# Patient Record
Sex: MALE | Race: OTHER | NOT HISPANIC OR LATINO | URBAN - METROPOLITAN AREA
[De-identification: names, ages, dates, MRNs, and addresses within clinical notes are randomized per-mention and may not be internally consistent; named-entity substitution may affect disease eponyms.]

---

## 2025-05-07 ENCOUNTER — OUTPATIENT (OUTPATIENT)
Dept: OUTPATIENT SERVICES | Facility: HOSPITAL | Age: 65
LOS: 1 days | Discharge: HOME | End: 2025-05-07
Payer: MEDICARE

## 2025-05-07 VITALS
OXYGEN SATURATION: 97 % | TEMPERATURE: 98 F | HEIGHT: 69 IN | DIASTOLIC BLOOD PRESSURE: 82 MMHG | HEART RATE: 76 BPM | SYSTOLIC BLOOD PRESSURE: 132 MMHG | WEIGHT: 195.11 LBS | RESPIRATION RATE: 18 BRPM

## 2025-05-07 VITALS — SYSTOLIC BLOOD PRESSURE: 123 MMHG | RESPIRATION RATE: 19 BRPM | DIASTOLIC BLOOD PRESSURE: 76 MMHG | HEART RATE: 67 BPM

## 2025-05-07 DIAGNOSIS — Z98.890 OTHER SPECIFIED POSTPROCEDURAL STATES: Chronic | ICD-10-CM

## 2025-05-07 DIAGNOSIS — Z95.1 PRESENCE OF AORTOCORONARY BYPASS GRAFT: Chronic | ICD-10-CM

## 2025-05-07 DIAGNOSIS — H25.11 AGE-RELATED NUCLEAR CATARACT, RIGHT EYE: ICD-10-CM

## 2025-05-07 PROCEDURE — V2632: CPT

## 2025-05-07 NOTE — ASU PATIENT PROFILE, ADULT - FALL HARM RISK - HARM RISK INTERVENTIONS

## 2025-05-07 NOTE — PRE-ANESTHESIA EVALUATION ADULT - NSANTHDIETYNSD_GEN_ALL_CORE
A/O x1, with expressive aphasia. VSS.  Pt denies any c/o of pain. Up with 1 and walker to chair x3 today.  On 2L NC, LS coarse, exp wheeze, frequent cough but unable to obtain sputum sample.  Tele is SR.  Plan to continue with abx and steriods, cardiology signed off, TCU at discharge.    Yes

## 2025-05-09 DIAGNOSIS — H25.811 COMBINED FORMS OF AGE-RELATED CATARACT, RIGHT EYE: ICD-10-CM

## 2025-05-14 ENCOUNTER — OUTPATIENT (OUTPATIENT)
Dept: OUTPATIENT SERVICES | Facility: HOSPITAL | Age: 65
LOS: 1 days | Discharge: HOME | End: 2025-05-14
Payer: MEDICARE

## 2025-05-14 VITALS
HEIGHT: 69 IN | RESPIRATION RATE: 17 BRPM | HEART RATE: 78 BPM | TEMPERATURE: 98 F | OXYGEN SATURATION: 97 % | DIASTOLIC BLOOD PRESSURE: 78 MMHG | SYSTOLIC BLOOD PRESSURE: 137 MMHG | WEIGHT: 195.11 LBS

## 2025-05-14 VITALS — HEART RATE: 80 BPM | SYSTOLIC BLOOD PRESSURE: 134 MMHG | DIASTOLIC BLOOD PRESSURE: 77 MMHG | RESPIRATION RATE: 18 BRPM

## 2025-05-14 DIAGNOSIS — Z95.1 PRESENCE OF AORTOCORONARY BYPASS GRAFT: Chronic | ICD-10-CM

## 2025-05-14 DIAGNOSIS — H25.12 AGE-RELATED NUCLEAR CATARACT, LEFT EYE: ICD-10-CM

## 2025-05-14 DIAGNOSIS — Z98.49 CATARACT EXTRACTION STATUS, UNSPECIFIED EYE: Chronic | ICD-10-CM

## 2025-05-14 DIAGNOSIS — Z98.890 OTHER SPECIFIED POSTPROCEDURAL STATES: Chronic | ICD-10-CM

## 2025-05-14 PROCEDURE — V2632: CPT

## 2025-05-14 RX ORDER — AMLODIPINE BESYLATE 10 MG/1
1 TABLET ORAL
Refills: 0 | DISCHARGE

## 2025-05-14 NOTE — ASU PATIENT PROFILE, ADULT - NSICDXPASTSURGICALHX_GEN_ALL_CORE_FT
PAST SURGICAL HISTORY:  H/O bilateral inguinal hernia repair     History of cataract surgery     S/P AAA repair     S/P CABG x 4     S/P cataract extraction R IOL

## 2025-05-14 NOTE — ASU PATIENT PROFILE, ADULT - FALL HARM RISK - HARM RISK INTERVENTIONS

## 2025-05-16 DIAGNOSIS — F17.210 NICOTINE DEPENDENCE, CIGARETTES, UNCOMPLICATED: ICD-10-CM

## 2025-05-16 DIAGNOSIS — I10 ESSENTIAL (PRIMARY) HYPERTENSION: ICD-10-CM

## 2025-05-16 DIAGNOSIS — H25.812 COMBINED FORMS OF AGE-RELATED CATARACT, LEFT EYE: ICD-10-CM

## 2025-05-16 DIAGNOSIS — Z95.1 PRESENCE OF AORTOCORONARY BYPASS GRAFT: ICD-10-CM

## 2025-05-16 DIAGNOSIS — E78.00 PURE HYPERCHOLESTEROLEMIA, UNSPECIFIED: ICD-10-CM

## 2025-05-16 DIAGNOSIS — F10.11 ALCOHOL ABUSE, IN REMISSION: ICD-10-CM

## 2025-06-13 PROBLEM — I10 ESSENTIAL (PRIMARY) HYPERTENSION: Chronic | Status: ACTIVE | Noted: 2025-05-07

## 2025-06-13 PROBLEM — E78.5 HYPERLIPIDEMIA, UNSPECIFIED: Chronic | Status: ACTIVE | Noted: 2025-05-07

## 2025-06-16 ENCOUNTER — OUTPATIENT (OUTPATIENT)
Dept: OUTPATIENT SERVICES | Facility: HOSPITAL | Age: 65
LOS: 1 days | Discharge: HOME | End: 2025-06-16
Payer: MEDICARE

## 2025-06-16 ENCOUNTER — EMERGENCY (EMERGENCY)
Facility: HOSPITAL | Age: 65
LOS: 0 days | Discharge: HOME | End: 2025-06-16
Attending: EMERGENCY MEDICINE
Payer: MEDICARE

## 2025-06-16 ENCOUNTER — RESULT REVIEW (OUTPATIENT)
Age: 65
End: 2025-06-16

## 2025-06-16 VITALS
HEIGHT: 69 IN | HEART RATE: 72 BPM | OXYGEN SATURATION: 95 % | WEIGHT: 199.96 LBS | RESPIRATION RATE: 18 BRPM | DIASTOLIC BLOOD PRESSURE: 89 MMHG | TEMPERATURE: 99 F | SYSTOLIC BLOOD PRESSURE: 141 MMHG

## 2025-06-16 VITALS
HEIGHT: 69 IN | SYSTOLIC BLOOD PRESSURE: 143 MMHG | DIASTOLIC BLOOD PRESSURE: 73 MMHG | HEART RATE: 70 BPM | RESPIRATION RATE: 20 BRPM | TEMPERATURE: 98 F | OXYGEN SATURATION: 95 %

## 2025-06-16 VITALS
DIASTOLIC BLOOD PRESSURE: 109 MMHG | HEART RATE: 74 BPM | RESPIRATION RATE: 19 BRPM | TEMPERATURE: 99 F | OXYGEN SATURATION: 96 % | SYSTOLIC BLOOD PRESSURE: 177 MMHG

## 2025-06-16 DIAGNOSIS — I10 ESSENTIAL (PRIMARY) HYPERTENSION: ICD-10-CM

## 2025-06-16 DIAGNOSIS — M79.606 PAIN IN LEG, UNSPECIFIED: ICD-10-CM

## 2025-06-16 DIAGNOSIS — J44.9 CHRONIC OBSTRUCTIVE PULMONARY DISEASE, UNSPECIFIED: ICD-10-CM

## 2025-06-16 DIAGNOSIS — Z95.1 PRESENCE OF AORTOCORONARY BYPASS GRAFT: Chronic | ICD-10-CM

## 2025-06-16 DIAGNOSIS — R59.0 LOCALIZED ENLARGED LYMPH NODES: ICD-10-CM

## 2025-06-16 DIAGNOSIS — Z98.49 CATARACT EXTRACTION STATUS, UNSPECIFIED EYE: Chronic | ICD-10-CM

## 2025-06-16 DIAGNOSIS — I72.4 ANEURYSM OF ARTERY OF LOWER EXTREMITY: ICD-10-CM

## 2025-06-16 DIAGNOSIS — E78.5 HYPERLIPIDEMIA, UNSPECIFIED: ICD-10-CM

## 2025-06-16 DIAGNOSIS — R53.83 OTHER FATIGUE: ICD-10-CM

## 2025-06-16 DIAGNOSIS — I71.40 ABDOMINAL AORTIC ANEURYSM, WITHOUT RUPTURE, UNSPECIFIED: ICD-10-CM

## 2025-06-16 DIAGNOSIS — Z98.890 OTHER SPECIFIED POSTPROCEDURAL STATES: Chronic | ICD-10-CM

## 2025-06-16 DIAGNOSIS — F17.210 NICOTINE DEPENDENCE, CIGARETTES, UNCOMPLICATED: ICD-10-CM

## 2025-06-16 DIAGNOSIS — Z98.890 OTHER SPECIFIED POSTPROCEDURAL STATES: ICD-10-CM

## 2025-06-16 LAB
ALBUMIN SERPL ELPH-MCNC: 4.4 G/DL — SIGNIFICANT CHANGE UP (ref 3.5–5.2)
ALP SERPL-CCNC: 82 U/L — SIGNIFICANT CHANGE UP (ref 30–115)
ALT FLD-CCNC: 11 U/L — SIGNIFICANT CHANGE UP (ref 0–41)
ANION GAP SERPL CALC-SCNC: 12 MMOL/L — SIGNIFICANT CHANGE UP (ref 7–14)
ANION GAP SERPL CALC-SCNC: 8 MMOL/L — SIGNIFICANT CHANGE UP (ref 7–14)
APPEARANCE UR: CLEAR — SIGNIFICANT CHANGE UP
APTT BLD: 26.7 SEC — LOW (ref 27–39.2)
APTT BLD: 27.4 SEC — SIGNIFICANT CHANGE UP (ref 27–39.2)
AST SERPL-CCNC: 18 U/L — SIGNIFICANT CHANGE UP (ref 0–41)
BASOPHILS # BLD AUTO: 0.1 K/UL — SIGNIFICANT CHANGE UP (ref 0–0.2)
BASOPHILS NFR BLD AUTO: 1 % — SIGNIFICANT CHANGE UP (ref 0–1)
BILIRUB SERPL-MCNC: 0.2 MG/DL — SIGNIFICANT CHANGE UP (ref 0.2–1.2)
BILIRUB UR-MCNC: NEGATIVE — SIGNIFICANT CHANGE UP
BLD GP AB SCN SERPL QL: SIGNIFICANT CHANGE UP
BUN SERPL-MCNC: 13 MG/DL — SIGNIFICANT CHANGE UP (ref 10–20)
BUN SERPL-MCNC: 13 MG/DL — SIGNIFICANT CHANGE UP (ref 10–20)
CALCIUM SERPL-MCNC: 9.2 MG/DL — SIGNIFICANT CHANGE UP (ref 8.4–10.5)
CALCIUM SERPL-MCNC: 9.5 MG/DL — SIGNIFICANT CHANGE UP (ref 8.4–10.5)
CHLORIDE SERPL-SCNC: 106 MMOL/L — SIGNIFICANT CHANGE UP (ref 98–110)
CHLORIDE SERPL-SCNC: 106 MMOL/L — SIGNIFICANT CHANGE UP (ref 98–110)
CO2 SERPL-SCNC: 25 MMOL/L — SIGNIFICANT CHANGE UP (ref 17–32)
CO2 SERPL-SCNC: 25 MMOL/L — SIGNIFICANT CHANGE UP (ref 17–32)
COLOR SPEC: YELLOW — SIGNIFICANT CHANGE UP
CREAT SERPL-MCNC: 0.7 MG/DL — SIGNIFICANT CHANGE UP (ref 0.7–1.5)
CREAT SERPL-MCNC: 0.7 MG/DL — SIGNIFICANT CHANGE UP (ref 0.7–1.5)
DIFF PNL FLD: NEGATIVE — SIGNIFICANT CHANGE UP
EGFR: 104 ML/MIN/1.73M2 — SIGNIFICANT CHANGE UP
EOSINOPHIL # BLD AUTO: 0.11 K/UL — SIGNIFICANT CHANGE UP (ref 0–0.7)
EOSINOPHIL NFR BLD AUTO: 1.1 % — SIGNIFICANT CHANGE UP (ref 0–8)
GLUCOSE SERPL-MCNC: 107 MG/DL — HIGH (ref 70–99)
GLUCOSE SERPL-MCNC: 95 MG/DL — SIGNIFICANT CHANGE UP (ref 70–99)
GLUCOSE UR QL: NEGATIVE MG/DL — SIGNIFICANT CHANGE UP
HCT VFR BLD CALC: 37.7 % — LOW (ref 42–52)
HGB BLD-MCNC: 12.3 G/DL — LOW (ref 14–18)
IMM GRANULOCYTES NFR BLD AUTO: 0.4 % — HIGH (ref 0.1–0.3)
INR BLD: 0.89 RATIO — SIGNIFICANT CHANGE UP (ref 0.65–1.3)
INR BLD: 0.92 RATIO — SIGNIFICANT CHANGE UP (ref 0.65–1.3)
KETONES UR QL: NEGATIVE MG/DL — SIGNIFICANT CHANGE UP
LEUKOCYTE ESTERASE UR-ACNC: NEGATIVE — SIGNIFICANT CHANGE UP
LYMPHOCYTES # BLD AUTO: 2.74 K/UL — SIGNIFICANT CHANGE UP (ref 1.2–3.4)
LYMPHOCYTES # BLD AUTO: 27.5 % — SIGNIFICANT CHANGE UP (ref 20.5–51.1)
MCHC RBC-ENTMCNC: 28.2 PG — SIGNIFICANT CHANGE UP (ref 27–31)
MCHC RBC-ENTMCNC: 32.6 G/DL — SIGNIFICANT CHANGE UP (ref 32–37)
MCV RBC AUTO: 86.5 FL — SIGNIFICANT CHANGE UP (ref 80–94)
MONOCYTES # BLD AUTO: 0.65 K/UL — HIGH (ref 0.1–0.6)
MONOCYTES NFR BLD AUTO: 6.5 % — SIGNIFICANT CHANGE UP (ref 1.7–9.3)
NEUTROPHILS # BLD AUTO: 6.31 K/UL — SIGNIFICANT CHANGE UP (ref 1.4–6.5)
NEUTROPHILS NFR BLD AUTO: 63.5 % — SIGNIFICANT CHANGE UP (ref 42.2–75.2)
NITRITE UR-MCNC: NEGATIVE — SIGNIFICANT CHANGE UP
NRBC BLD AUTO-RTO: 0 /100 WBCS — SIGNIFICANT CHANGE UP (ref 0–0)
PH UR: 7 — SIGNIFICANT CHANGE UP (ref 5–8)
PLATELET # BLD AUTO: 383 K/UL — SIGNIFICANT CHANGE UP (ref 130–400)
PMV BLD: 9.6 FL — SIGNIFICANT CHANGE UP (ref 7.4–10.4)
POTASSIUM SERPL-MCNC: 4.4 MMOL/L — SIGNIFICANT CHANGE UP (ref 3.5–5)
POTASSIUM SERPL-MCNC: 4.9 MMOL/L — SIGNIFICANT CHANGE UP (ref 3.5–5)
POTASSIUM SERPL-SCNC: 4.4 MMOL/L — SIGNIFICANT CHANGE UP (ref 3.5–5)
POTASSIUM SERPL-SCNC: 4.9 MMOL/L — SIGNIFICANT CHANGE UP (ref 3.5–5)
PROT SERPL-MCNC: 7.9 G/DL — SIGNIFICANT CHANGE UP (ref 6–8)
PROT UR-MCNC: NEGATIVE MG/DL — SIGNIFICANT CHANGE UP
PROTHROM AB SERPL-ACNC: 10.5 SEC — SIGNIFICANT CHANGE UP (ref 9.95–12.87)
PROTHROM AB SERPL-ACNC: 10.8 SEC — SIGNIFICANT CHANGE UP (ref 9.95–12.87)
RBC # BLD: 4.36 M/UL — LOW (ref 4.7–6.1)
RBC # FLD: 17.5 % — HIGH (ref 11.5–14.5)
SODIUM SERPL-SCNC: 139 MMOL/L — SIGNIFICANT CHANGE UP (ref 135–146)
SODIUM SERPL-SCNC: 143 MMOL/L — SIGNIFICANT CHANGE UP (ref 135–146)
SP GR SPEC: >1.03 — HIGH (ref 1–1.03)
UROBILINOGEN FLD QL: 0.2 MG/DL — SIGNIFICANT CHANGE UP (ref 0.2–1)
WBC # BLD: 9.95 K/UL — SIGNIFICANT CHANGE UP (ref 4.8–10.8)
WBC # FLD AUTO: 9.95 K/UL — SIGNIFICANT CHANGE UP (ref 4.8–10.8)

## 2025-06-16 PROCEDURE — 85025 COMPLETE CBC W/AUTO DIFF WBC: CPT

## 2025-06-16 PROCEDURE — 93010 ELECTROCARDIOGRAM REPORT: CPT

## 2025-06-16 PROCEDURE — 86901 BLOOD TYPING SEROLOGIC RH(D): CPT

## 2025-06-16 PROCEDURE — 76882 US LMTD JT/FCL EVL NVASC XTR: CPT | Mod: LT

## 2025-06-16 PROCEDURE — 86900 BLOOD TYPING SEROLOGIC ABO: CPT

## 2025-06-16 PROCEDURE — 93005 ELECTROCARDIOGRAM TRACING: CPT

## 2025-06-16 PROCEDURE — 71045 X-RAY EXAM CHEST 1 VIEW: CPT

## 2025-06-16 PROCEDURE — 85730 THROMBOPLASTIN TIME PARTIAL: CPT

## 2025-06-16 PROCEDURE — 36415 COLL VENOUS BLD VENIPUNCTURE: CPT

## 2025-06-16 PROCEDURE — 36000 PLACE NEEDLE IN VEIN: CPT | Mod: XU

## 2025-06-16 PROCEDURE — 71045 X-RAY EXAM CHEST 1 VIEW: CPT | Mod: 26

## 2025-06-16 PROCEDURE — 76882 US LMTD JT/FCL EVL NVASC XTR: CPT | Mod: 26,LT

## 2025-06-16 PROCEDURE — 99285 EMERGENCY DEPT VISIT HI MDM: CPT | Mod: 25

## 2025-06-16 PROCEDURE — 99285 EMERGENCY DEPT VISIT HI MDM: CPT | Mod: FS

## 2025-06-16 PROCEDURE — 74174 CTA ABD&PLVS W/CONTRAST: CPT | Mod: 26

## 2025-06-16 PROCEDURE — 81003 URINALYSIS AUTO W/O SCOPE: CPT

## 2025-06-16 PROCEDURE — 85610 PROTHROMBIN TIME: CPT

## 2025-06-16 PROCEDURE — 86850 RBC ANTIBODY SCREEN: CPT

## 2025-06-16 PROCEDURE — 74174 CTA ABD&PLVS W/CONTRAST: CPT

## 2025-06-16 PROCEDURE — 80048 BASIC METABOLIC PNL TOTAL CA: CPT

## 2025-06-16 PROCEDURE — 80053 COMPREHEN METABOLIC PANEL: CPT

## 2025-06-16 RX ORDER — ASPIRIN 325 MG
1 TABLET ORAL
Refills: 0 | DISCHARGE

## 2025-06-16 RX ORDER — SODIUM CHLORIDE 9 G/1000ML
1000 INJECTION, SOLUTION INTRAVENOUS ONCE
Refills: 0 | Status: COMPLETED | OUTPATIENT
Start: 2025-06-16 | End: 2025-06-16

## 2025-06-16 RX ORDER — AMLODIPINE BESYLATE 10 MG/1
0 TABLET ORAL
Refills: 0 | DISCHARGE

## 2025-06-16 RX ORDER — AMLODIPINE BESYLATE 10 MG/1
1 TABLET ORAL
Refills: 0 | DISCHARGE

## 2025-06-16 RX ORDER — ACETAMINOPHEN 500 MG/5ML
650 LIQUID (ML) ORAL ONCE
Refills: 0 | Status: COMPLETED | OUTPATIENT
Start: 2025-06-16 | End: 2025-06-16

## 2025-06-16 RX ADMIN — SODIUM CHLORIDE 1000 MILLILITER(S): 9 INJECTION, SOLUTION INTRAVENOUS at 15:56

## 2025-06-16 RX ADMIN — Medication 650 MILLIGRAM(S): at 15:57

## 2025-06-16 NOTE — ED ADULT TRIAGE NOTE - CHIEF COMPLAINT QUOTE
Pt brought by rn from IR, pt was scheduled to have a L inguinal lymph node biopsy due to groin pain, was found to have a L groin pulsating mass, sent to ED

## 2025-06-16 NOTE — ED PROVIDER NOTE - OBJECTIVE STATEMENT
64-year-old male with a past medical history of hypertension and hld presents for left groin mass.  Patient states he recently had a PET scan due to the mass and when he went to follow-up with IR today he was referred to the ED because they believed it could be an aneurysm.  Patient states to have noticed the mass for "a while". pt denies any other symptoms including fevers, chill, headache, recent illness/travel, cough, abdominal pain, chest pain, or SOB.

## 2025-06-16 NOTE — ED PROVIDER NOTE - DIFFERENTIAL DIAGNOSIS
The differential diagnosis for patients clinical presentation includes but is not limited to:  malignancy  aneurysm  metabolic vs infectious etiology  fistula Differential Diagnosis

## 2025-06-16 NOTE — ED PROVIDER NOTE - PHYSICAL EXAMINATION
Gen: NAD, AOx3  Head: NCAT  HEENT: PERRL, oral mucosa moist, normal conjunctiva, oropharynx clear without exudate or erythema  Lung: CTAB, no respiratory distress, no wheezing, rales, rhonchi  CV: normal s1/s2, rrr, Normal perfusion, pulses 2+ throughout  Abd: soft, NTND, no CVA tenderness  Genitourinary: no pelvic tenderness, L inguinal pulsatile mass  MSK: No edema, no visible deformities, full range of motion in all 4 extremities  Neuro: CN II-XII grossly intact, No focal neurologic deficits  Skin: No rash   Psych: normal affect

## 2025-06-16 NOTE — ED PROVIDER NOTE - NSFOLLOWUPINSTRUCTIONS_ED_ALL_ED_FT
Please follow-up with Dr. Espinoza at your scheduled appointment on June 24 at 1:15 PM.  Pseudoaneurysms, often occurring after procedures like cardiac catheterization, require careful management. Small, asymptomatic pseudoaneurysms may resolve on their own with observation and blood pressure control. Larger or symptomatic pseudoaneurysms may need treatment, including ultrasound-guided compression, thrombin injection, or surgical repair. It's crucial to manage risk factors like high blood pressure, avoid nicotine and alcohol, and follow a healthy lifestyle.   Home Care Instructions:  Medication:  Take all medications as prescribed, including blood pressure and other medications. Contact your doctor if you experience side effects.   Activity:  Follow your doctor's recommendations for physical activity. Some limitations may be necessary to avoid rupture.   Blood Pressure Management:  Control high blood pressure through lifestyle changes and medications, as it can increase the risk of rupture.   Diet:  Follow a heart-healthy diet, including fruits, vegetables, whole grains, and low-fat dairy. Limit sodium, alcohol, and sweets.   Weight Management:  Maintain a healthy weight. If needed, work with your doctor on a weight loss plan.   Smoking and Alcohol:  Avoid smoking and excessive alcohol consumption. If you need help quitting, talk to your doctor, says [facility].   Follow-up:  Attend all scheduled follow-up appointments with your doctor, including ultrasound scans, to monitor the pseudoaneurysm's progress.   Recognize Symptoms:  Be aware of symptoms like increasing pain, swelling, or throbbing at the site. Seek immediate medical attention if you experience any of these, especially if you had a recent endovascular procedure.   Rupture:  A pseudoaneurysm rupture is a medical emergency. Call 911 if you experience symptoms like severe pain, rapid swelling, or signs of shock, according to the [facility].   Treatment Options:  Observation:  .  Small, asymptomatic pseudoaneurysms may be monitored with regular checkups and ultrasounds for spontaneous closure.   Ultrasound-Guided Compression:  .  For larger pseudoaneurysms, direct pressure on the neck of the pseudoaneurysm under ultrasound guidance can help promote clotting.   Thrombin Injection:  .  Ultrasound-guided injection of thrombin into the pseudoaneurysm can also promote clotting and closure.   Surgical Repair:  .  In some cases, surgical repair may be necessary, especially for large, symptomatic, or infected pseudoaneurysms, according to [facility].   Important Considerations:  Distal Pulses:  Always assess and document distal pulses before and after any intervention, recommends [employer].   Infected Pseudoaneurysms:  If infected, ensure appropriate blood tests (FBC, CRP, U&Es, clotting) and blood cultures are taken.   Complications:  Monitor for complications like embolization, rupture, bleeding, or thrombosis.   Interprofessional Collaboration:  Management of pseudoaneurysms often requires a team approach involving physicians, specialists, and nurses. Please follow-up with Dr. Espinoza at your scheduled appointment on June 24 at 1:15 PM.    Pseudoaneurysms, often occurring after procedures like cardiac catheterization, require careful management. Small, asymptomatic pseudoaneurysms may resolve on their own with observation and blood pressure control. Larger or symptomatic pseudoaneurysms may need treatment, including ultrasound-guided compression, thrombin injection, or surgical repair. It's crucial to manage risk factors like high blood pressure, avoid nicotine and alcohol, and follow a healthy lifestyle.   Home Care Instructions:  Medication:  Take all medications as prescribed, including blood pressure and other medications. Contact your doctor if you experience side effects.   Activity:  Follow your doctor's recommendations for physical activity. Some limitations may be necessary to avoid rupture.   Blood Pressure Management:  Control high blood pressure through lifestyle changes and medications, as it can increase the risk of rupture.   Diet:  Follow a heart-healthy diet, including fruits, vegetables, whole grains, and low-fat dairy. Limit sodium, alcohol, and sweets.   Weight Management:  Maintain a healthy weight. If needed, work with your doctor on a weight loss plan.   Smoking and Alcohol:  Avoid smoking and excessive alcohol consumption. If you need help quitting, talk to your doctor, says [facility].   Follow-up:  Attend all scheduled follow-up appointments with your doctor, including ultrasound scans, to monitor the pseudoaneurysm's progress.   Recognize Symptoms:  Be aware of symptoms like increasing pain, swelling, or throbbing at the site. Seek immediate medical attention if you experience any of these, especially if you had a recent endovascular procedure.   Rupture:  A pseudoaneurysm rupture is a medical emergency. Call 911 if you experience symptoms like severe pain, rapid swelling, or signs of shock, according to the [facility].   Treatment Options:  Observation:  .  Small, asymptomatic pseudoaneurysms may be monitored with regular checkups and ultrasounds for spontaneous closure.   Ultrasound-Guided Compression:  .  For larger pseudoaneurysms, direct pressure on the neck of the pseudoaneurysm under ultrasound guidance can help promote clotting.   Thrombin Injection:  .  Ultrasound-guided injection of thrombin into the pseudoaneurysm can also promote clotting and closure.   Surgical Repair:  .  In some cases, surgical repair may be necessary, especially for large, symptomatic, or infected pseudoaneurysms, according to [facility].   Important Considerations:  Distal Pulses:  Always assess and document distal pulses before and after any intervention, recommends [employer].   Infected Pseudoaneurysms:  If infected, ensure appropriate blood tests (FBC, CRP, U&Es, clotting) and blood cultures are taken.   Complications:  Monitor for complications like embolization, rupture, bleeding, or thrombosis.   Interprofessional Collaboration:  Management of pseudoaneurysms often requires a team approach involving physicians, specialists, and nurses.

## 2025-06-16 NOTE — ED PROVIDER NOTE - CLINICAL SUMMARY MEDICAL DECISION MAKING FREE TEXT BOX
64-year-old male with past medical history of hypertension, hyperlipidemia, COPD not on home O2, lymphadenopathy, pshx of inginal hernia repairs, came to the hospital today to be seen by IR for left groin inguinal lymph node biopsy, ultrasound was obtained and noted to have a pulsatile mass so sent to the ED for further evaluation.  Patient reports that his PMD had him get a PET scan on May 30 for this left inguinal mass and then was sent to follow-up with hematologist oncologist Dr. Young approximately 4 days ago who told patient she would need a biopsy so was sent in today.  Patient denies discomfort to the left inguinal mass, throbbing, constant, moderate intensity non-radiating, worse with palpation, no alleviating factors.  Patient denies night sweats, decreased appetite, weight loss.  denies fever, chills, n/v, cp, sob, pleuritic chest pain, palpitations, diaphoresis, cough, abd pain, diarrhea, constipation, melena/brbpr, urinary symptoms, back/ flank pain, penile pain/discharge, sick contacts, recent travel or rash.     on exam: non toxic pt sitting on stretcher in nad, no rash, mmm, regular rate, radial pulses 2/4 b/l, no jvd, ctabl w/ breath sounds present b/l, no wheezing or crackles, no accessory muscle use, no tachypnea, no stridor, bs present throughout all 4 quadrants, abd soft, nd, nt , no rebound tenderness or guarding, no cvat, (-) Rovsing (-) Obturator (-) Psaos. (-) Martinez's,  exam done with supervision with LEELEE Mcdonough as given permission by pt: circumcised male, no penile pain or discharge, no testicular pain/swelling/erythema, L inguinal pulsatile mass, good cremasteric reflex, FROM of ext, no edema, no calf pain/swelling/erythema, AAOx3. No focal deficits.    Plan: Labs, ivf, imaging, pain control, vascular surgery consult, reassess.   Labs and EKG were ordered and reviewed.  Imaging was ordered and reviewed by me.  Appropriate medications for patient's presenting complaints were ordered and effects were reassessed.  Patient's records (prior hospital, ED visit) were reviewed. Additional history obtained from Daughter. 64-year-old male with past medical history of hypertension, hyperlipidemia, COPD not on home O2, lymphadenopathy, pshx of inginal hernia repairs, came to the hospital today to be seen by IR for left groin inguinal lymph node biopsy, ultrasound was obtained and noted to have a pulsatile mass so sent to the ED for further evaluation.  Patient reports that his PMD had him get a PET scan on May 30 for this left inguinal mass and then was sent to follow-up with hematologist oncologist Dr. Young approximately 4 days ago who told patient she would need a biopsy so was sent in today.  Patient denies discomfort to the left inguinal mass, throbbing, constant, moderate intensity non-radiating, worse with palpation, no alleviating factors.  Patient denies night sweats, decreased appetite, weight loss.  denies fever, chills, n/v, cp, sob, pleuritic chest pain, palpitations, diaphoresis, cough, abd pain, diarrhea, constipation, melena/brbpr, urinary symptoms, back/ flank pain, penile pain/discharge, sick contacts, recent travel or rash.     on exam: non toxic pt sitting on stretcher in nad, no rash, mmm, regular rate, radial pulses 2/4 b/l, no jvd, ctabl w/ breath sounds present b/l, no wheezing or crackles, no accessory muscle use, no tachypnea, no stridor, bs present throughout all 4 quadrants, abd soft, nd, nt , no rebound tenderness or guarding, no cvat, (-) Rovsing (-) Obturator (-) Psaos. (-) Martinez's,  exam done with supervision with LEELEE Mcdonough as given permission by pt: circumcised male, no penile pain or discharge, no testicular pain/swelling/erythema, L inguinal pulsatile mass, good cremasteric reflex, FROM of ext, no edema, no calf pain/swelling/erythema, AAOx3. No focal deficits.    Plan: Labs, ivf, imaging, pain control, vascular surgery consult, reassess.   Labs and EKG were ordered and reviewed.  Imaging was ordered and reviewed by me.  Appropriate medications for patient's presenting complaints were ordered and effects were reassessed.  Patient's records (prior hospital, ED visit) were reviewed. Additional history obtained from Daughter. Escalation to admission/observation was considered. However patient feels much better and is comfortable with discharge.  Appropriate follow-up was arranged.  Supportive care and home care discussed in detail. Patient aware they may have to return for re-evaluation and possible admission if outpatient treatment fails. Strict return precautions discussed.

## 2025-06-16 NOTE — ED PROVIDER NOTE - PATIENT PORTAL LINK FT
You can access the FollowMyHealth Patient Portal offered by [facility] by registering at the following website: [email or web address]. By joining [employer]’s FollowMyHealth portal, you will also be able to view your health information using other applications (apps) compatible with our system.

## 2025-06-16 NOTE — H&P ADULT - NSHPPHYSICALEXAM_GEN_ALL_CORE
Gen: A&O NAD    HEENT: NCAT, EOMI, not icteric. External ears normal. No rhinorrhea. Moist mucous membranes.    Neck: Supple, full range of motion, no observable masses, No meningeal sign.    Lungs: No Respiratory distress.    CV: RRR, no edema.    Abdomen: Soft, nondistended, No rebound tenderness.    MSK: No joint swelling, no redness.    Skin: No rashes, petechiae, lesions. Normal color per patient.    Neuro: Normal Gait, Grossly intact.    Psych: Appropriate for situation.

## 2025-06-16 NOTE — CONSULT NOTE ADULT - SUBJECTIVE AND OBJECTIVE BOX
VASCULAR SURGERY CONSULT NOTE      HPI:      Vascular surgery got consulted for     On PE:       Pulses as following:      PAST MEDICAL & SURGICAL HISTORY:  HTN (hypertension)      HLD (hyperlipidemia)      S/P AAA repair      S/P CABG x 4      H/O bilateral inguinal hernia repair      History of cataract surgery      S/P cataract extraction  R IOL        No Known Allergies    Home Medications:  amLODIPine 10 mg oral tablet: 1 tab(s) orally (16 Jun 2025 09:43)  aspirin 81 mg oral tablet, chewable: 1 tab(s) chewed (16 Jun 2025 09:47)    No permtinent family history of PVD    REVIEW OF SYSTEMS:  GENERAL:                                         negative  SKIN:                                                 negative  OPTHALMOLOGIC:                          negative  ENMT:                                               negative  RESPIRATORY AND THORAX:        negative  CARDIOVASCULAR:                         negative  GASTROINTESTINAL:                       negative  NEPHROLOGY:                                  negative  MUSCULOSKELETAL:                       negative  NEUROLOGIC:                                   negative  PSYCHIATRIC:                                    negative  HEMATOLOGY/LYMPHATICS:         negative  ENDOCRINE:                                     negative  ALLERGIC/IMMUNOLOGIC:            negative    12 point ROS otherwise normal except as stated in HPI  FHx: none  SHX:  [ ]  smoking     [ ] alcohol use    PHYSICAL EXAM  Vital Signs Last 24 Hrs  T(C): 37.2 (16 Jun 2025 17:00), Max: 37.2 (16 Jun 2025 17:00)  T(F): 98.9 (16 Jun 2025 17:00), Max: 98.9 (16 Jun 2025 17:00)  HR: 74 (16 Jun 2025 17:00) (70 - 74)  BP: 177/109 (16 Jun 2025 17:00) (141/89 - 177/109)  BP(mean): 132 (16 Jun 2025 17:00) (132 - 132)  RR: 19 (16 Jun 2025 17:00) (18 - 20)  SpO2: 96% (16 Jun 2025 17:00) (95% - 96%)    Parameters below as of 16 Jun 2025 17:00  Patient On (Oxygen Delivery Method): room air        Appearance: Normal	  HEENT:   Normal oral mucosa, PERRL, EOMI	  Neck: Supple, - JVD; Carotid Bruit   Cardiovascular: Normal S1 S2, No JVD, No murmurs,   Respiratory: Lungs clear to auscultation, No Rales, Rhonchi, Wheezing	  Gastrointestinal:  Soft, Non-tender, positive BS	  Skin: No rashes, No ecchymoses, No cyanosis  Extremities: Normal range of motion, No clubbing, cyanosis or edema  Vascular: As below  Neurologic: Non-focal  Psychiatry: A & O x 3, Mood & affect appropriate      PULSES:  Femoral:  Popliteal:  Dorsal Pedal:  Posterior Tibial:  Capillary:    MEDICATIONS:   MEDICATIONS  (STANDING):    MEDICATIONS  (PRN):      LAB/STUDIES:                        12.3   9.95  )-----------( 383      ( 16 Jun 2025 14:19 )             37.7     06-16    143  |  106  |  13  ----------------------------<  95  4.4   |  25  |  0.7    Ca    9.5      16 Jun 2025 14:19    TPro  7.9  /  Alb  4.4  /  TBili  0.2  /  DBili  x   /  AST  18  /  ALT  11  /  AlkPhos  82  06-16    PT/INR - ( 16 Jun 2025 14:19 )   PT: 10.80 sec;   INR: 0.92 ratio         PTT - ( 16 Jun 2025 14:19 )  PTT:27.4 sec  LIVER FUNCTIONS - ( 16 Jun 2025 14:19 )  Alb: 4.4 g/dL / Pro: 7.9 g/dL / ALK PHOS: 82 U/L / ALT: 11 U/L / AST: 18 U/L / GGT: x             Urinalysis Basic - ( 16 Jun 2025 16:18 )    Color: Yellow / Appearance: Clear / SG: >1.030 / pH: x  Gluc: x / Ketone: x  / Bili: Negative / Urobili: 0.2 mg/dL   Blood: x / Protein: Negative mg/dL / Nitrite: Negative   Leuk Esterase: Negative / RBC: x / WBC x   Sq Epi: x / Non Sq Epi: x / Bacteria: x                  Urinalysis with Rflx Culture (collected 16 Jun 2025 16:18)        IMAGING:  < from: CT Angio Abdomen and Pelvis w/ IV Cont (06.16.25 @ 15:51) >  Approximate 7 cm mass left groin mass is noted, secondary to an   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    No vascular occlusion or adenopathy.    Extensive arterial calcifications.    Bilateral fat-containing inguinal hernias.    < end of copied text >           VASCULAR SURGERY CONSULT NOTE      HPI:  64 y.o. M w/ PMH of HTN, HLD, COPD no on home O2, lymphadenopathy, pshx of inguinal hernia repairs, came to the hospital today to be seen by IR for left groin inguinal lymph node biopsy, ultrasound was obtained and noted to have a pulsatile mass and was sent to ED for further evaluation. Pt reports seeing vascular surgeon in [place] Dr. Wong for aneurism on the LLE and was performed "some yi of procedure that required hospital stay for 2 days. Pt can't recall what was the procedure for. Pe pt, he also has R popliteal aneurism and was told it need to be taken care of, but pt refused intervention at that time. Also known to the pt he has abdominal aneurism and needs to be monitored annually Last time seen vascular surgeon 2 years ago, since "the procedure on left leg". Pt is current smoker 1 pack/day for 50 years. Pt mentions to walk about 1-1.5 miles per day with some mild leg pain, attributing to muscle fatigue.    Vascular surgery got consulted for L groin pseudoaneurysm    On PE: L groin pulsatile mass, no skin changes/ulcerations/discoloration. Pulses as following: B/l palpable DP and PT      PAST MEDICAL & SURGICAL HISTORY:  HTN (hypertension)      HLD (hyperlipidemia)      S/P AAA repair      S/P CABG x 4      H/O bilateral inguinal hernia repair      History of cataract surgery      S/P cataract extraction  R IOL        No Known Allergies    Home Medications:  amLODIPine 10 mg oral tablet: 1 tab(s) orally (16 Jun 2025 09:43)  aspirin 81 mg oral tablet, chewable: 1 tab(s) chewed (16 Jun 2025 09:47)    SHX:  [x]  smoking     [ ] alcohol use    PHYSICAL EXAM  Vital Signs Last 24 Hrs  T(C): 37.2 (16 Jun 2025 17:00), Max: 37.2 (16 Jun 2025 17:00)  T(F): 98.9 (16 Jun 2025 17:00), Max: 98.9 (16 Jun 2025 17:00)  HR: 74 (16 Jun 2025 17:00) (70 - 74)  BP: 177/109 (16 Jun 2025 17:00) (141/89 - 177/109)  BP(mean): 132 (16 Jun 2025 17:00) (132 - 132)  RR: 19 (16 Jun 2025 17:00) (18 - 20)  SpO2: 96% (16 Jun 2025 17:00) (95% - 96%)    Parameters below as of 16 Jun 2025 17:00  Patient On (Oxygen Delivery Method): room air        Appearance: Normal	  HEENT:   Normal oral mucosa, PERRL, EOMI	  Neck: Supple, - JVD  Cardiovascular: RRR  Respiratory: On RA, saturating well, Lungs clear to auscultation  Gastrointestinal:  Soft, Non-tender  Skin: L groin pulsatile mass, No rashes, No ecchymoses, No cyanosis  Extremities: Normal range of motion, No clubbing, cyanosis or edema  Vascular: Pulses as below  Neurologic: Non-focal  Psychiatry: A & O x 3, Mood & affect appropriate    PULSES:  Dorsal Pedal: Palpable b/l  Posterior Tibial: Palpable b/l    MEDICATIONS:   MEDICATIONS  (STANDING):    MEDICATIONS  (PRN):      LAB/STUDIES:                        12.3   9.95  )-----------( 383      ( 16 Jun 2025 14:19 )             37.7     06-16    143  |  106  |  13  ----------------------------<  95  4.4   |  25  |  0.7    Ca    9.5      16 Jun 2025 14:19    TPro  7.9  /  Alb  4.4  /  TBili  0.2  /  DBili  x   /  AST  18  /  ALT  11  /  AlkPhos  82  06-16    PT/INR - ( 16 Jun 2025 14:19 )   PT: 10.80 sec;   INR: 0.92 ratio         PTT - ( 16 Jun 2025 14:19 )  PTT:27.4 sec  LIVER FUNCTIONS - ( 16 Jun 2025 14:19 )  Alb: 4.4 g/dL / Pro: 7.9 g/dL / ALK PHOS: 82 U/L / ALT: 11 U/L / AST: 18 U/L / GGT: x             Urinalysis Basic - ( 16 Jun 2025 16:18 )    Color: Yellow / Appearance: Clear / SG: >1.030 / pH: x  Gluc: x / Ketone: x  / Bili: Negative / Urobili: 0.2 mg/dL   Blood: x / Protein: Negative mg/dL / Nitrite: Negative   Leuk Esterase: Negative / RBC: x / WBC x   Sq Epi: x / Non Sq Epi: x / Bacteria: x                  Urinalysis with Rflx Culture (collected 16 Jun 2025 16:18)        IMAGING:  < from: CT Angio Abdomen and Pelvis w/ IV Cont (06.16.25 @ 15:51) >  Approximate 7 cm mass left groin mass is noted, secondary to an   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    No vascular occlusion or adenopathy.    Extensive arterial calcifications.    Bilateral fat-containing inguinal hernias.    < end of copied text >

## 2025-06-16 NOTE — PROGRESS NOTE ADULT - SUBJECTIVE AND OBJECTIVE BOX
Interventional Radiology Follow- Up Note      64y Male presented for left groin LN biopsy in Interventional Radiology with Dr Kaba  On scanning the gorin with ultraosund, the reported necrotic lymph node is actually a large pseudoaneurysm.  Biopsy was canceled and patient recommended to go directly to the ED.  Patient instructed to remain NPO.      Dr. Kaba d/w w/ ED LEELEE Adair via telephone at 12:29pm.    Vitals: T(F): 98.6 (06-16-25 @ 09:34), Max: 98.6 (06-16-25 @ 09:34)  HR: 72 (06-16-25 @ 09:34) (72 - 72)  BP: 141/89 (06-16-25 @ 09:34) (141/89 - 141/89)  RR: 18 (06-16-25 @ 09:34) (18 - 18)  SpO2: 95% (06-16-25 @ 09:34) (95% - 95%)  Wt(kg): --    LABS:    06-16    139  |  106  |  13  ----------------------------<  107[H]  4.9   |  25  |  0.7    Ca    9.2      16 Jun 2025 10:30      PT/INR - ( 16 Jun 2025 10:30 )   PT: 10.50 sec;   INR: 0.89 ratio    PTT - ( 16 Jun 2025 10:30 )  PTT:26.7 sec    Culture:   output :    PHYSICAL EXAM:  General: Nontoxic, in NAD  Left groin:  non-tender pulsatile mass, ~ 3 x 6 cm.    U/S:  Pseudoaneurysm.    Impression: 64y Male admitted with left groin pseudoaneurysm.        Plan:       Please call Interventional Radiology [phone] with any questions, concerns, or issues regarding above.

## 2025-06-16 NOTE — H&P ADULT - HISTORY OF PRESENT ILLNESS
64 yr old male with PMH HTN, enlarged lymph nodes, HLD, COPD presents to IR for left groin inguinal lymph nose biopsy 64 yr old male with PMH HTN, enlarged lymph nodes, HLD, COPD presents to IR for left groin inguinal lymph node biopsy, however after evaluation it was determined that he has a left groin psa and was sent to the ED.

## 2025-06-16 NOTE — ASU PREOP CHECKLIST - ALLERGIES REVIEWED
done
Rivaroxaban/Xarelto - Compliance/Rivaroxaban/Xarelto - Dietary Advice/Rivaroxaban/Xarelto - Follow up monitoring/Rivaroxaban/Xarelto - Potential for adverse drug reactions and interactions

## 2025-06-16 NOTE — ASU DISCHARGE PLAN (ADULT/PEDIATRIC) - NS MD DC FALL RISK RISK
For information on Fall & Injury Prevention, visit: [email or web address] OR  [email or web address] OR  https://www.cdc.gov/steadi/patient.html

## 2025-06-16 NOTE — ED PROVIDER NOTE - PROGRESS NOTE DETAILS
ED Attending ABDIRIZAK Parisi  Vascular made aware of patient due to concern of pulsatile mass, will evaluate. ED Attending ABDIRIZAK Parisi  vascular evaluated patient, states that follow up on 6/24 with Dr. Skelton at 1:15 PM for follow up and elective procedure follow up. pt aware.

## 2025-06-16 NOTE — ED PROVIDER NOTE - CARE PROVIDER_API CALL
Noel Skelton.  Vascular Surgery  [address] [place] [address], [address]  [place] [address]  Phone: [phone]  Fax: [phone]  Scheduled Appointment: 06/24/2025 01:00 PM

## 2025-06-16 NOTE — CONSULT NOTE ADULT - ASSESSMENT
Assessment:      Plan:  - No acute vascular surgery intervention  - Pt can be dc from vascular stand point  - Pt has appointment w/ Dr. Skelton outpatient on 6/24/25 @ 1:15PM      Above plan discussed with fellow Dr. Hatfield and attending Dr. Skelton Assessment:  64 y.o. M w/ PMH of HTN, HLD, COPD no on home O2, lymphadenopathy, pshx of inguinal hernia repairs, came to the hospital today to be seen by IR for left groin inguinal lymph node biopsy, ultrasound was obtained and noted to have a pulsatile mass and was sent to ED for further evaluation.    Vascular surgery got consulted for L groin pseudoaneurysm    Plan:  - No acute vascular surgery intervention  - Pt can be dc from vascular stand point  - Pt has appointment w/ Dr. Skelton outpatient on 6/24/25 @ 1:15PM      Above plan discussed with fellow Dr. Hatfield and attending Dr. Skelton

## 2025-06-16 NOTE — ASU DISCHARGE PLAN (ADULT/PEDIATRIC) - FINANCIAL ASSISTANCE
[facility] provides services at a reduced cost to those who are determined to be eligible through [facility]’s financial assistance program. Information regarding [facility]’s financial assistance program can be found by going to [email or web address] or by calling [phone].

## 2025-06-16 NOTE — H&P ADULT - ASSESSMENT
64 yr old male with PMH HTN, enlarged lymph nodes, HLD, COPD presents to IR for left groin inguinal lymph nose biopsy Sent to ER for evaluation of left groin psa

## 2025-06-16 NOTE — ED PROVIDER NOTE - ATTENDING APP SHARED VISIT CONTRIBUTION OF CARE
64-year-old male with past medical history of hypertension, hyperlipidemia, COPD not on home O2, lymphadenopathy, pshx of inginal hernia repairs, came to the hospital today to be seen by IR for left groin inguinal lymph node biopsy, ultrasound was obtained and noted to have a pulsatile mass so sent to the ED for further evaluation.  Patient reports that his PMD had him get a PET scan on May 30 for this left inguinal mass and then was sent to follow-up with hematologist oncologist Dr. Young approximately 4 days ago who told patient she would need a biopsy so was sent in today.  Patient denies discomfort to the left inguinal mass, throbbing, constant, moderate intensity non-radiating, worse with palpation, no alleviating factors.  Patient denies night sweats, decreased appetite, weight loss.  denies fever, chills, n/v, cp, sob, pleuritic chest pain, palpitations, diaphoresis, cough, abd pain, diarrhea, constipation, melena/brbpr, urinary symptoms, back/ flank pain, penile pain/discharge, sick contacts, recent travel or rash.     on exam: non toxic pt sitting on stretcher in nad, no rash, mmm, regular rate, radial pulses 2/4 b/l, no jvd, ctabl w/ breath sounds present b/l, no wheezing or crackles, no accessory muscle use, no tachypnea, no stridor, bs present throughout all 4 quadrants, abd soft, nd, nt , no rebound tenderness or guarding, no cvat, (-) Rovsing (-) Obturator (-) Psaos. (-) Martinez's,  exam done with supervision with LEELEE Mcdonough as given permission by pt: circumcised male, no penile pain or discharge, no testicular pain/swelling/erythema, L inguinal pulsatile mass, good cremasteric reflex, FROM of ext, no edema, no calf pain/swelling/erythema, AAOx3. No focal deficits.    Plan: Labs, ivf, imaging, pain control, vascular surgery consult, reassess.

## 2025-06-24 ENCOUNTER — APPOINTMENT (OUTPATIENT)
Dept: VASCULAR SURGERY | Facility: CLINIC | Age: 65
End: 2025-06-24